# Patient Record
Sex: MALE | Race: WHITE | NOT HISPANIC OR LATINO | Employment: UNEMPLOYED | URBAN - METROPOLITAN AREA
[De-identification: names, ages, dates, MRNs, and addresses within clinical notes are randomized per-mention and may not be internally consistent; named-entity substitution may affect disease eponyms.]

---

## 2017-12-12 ENCOUNTER — NON-PROVIDER VISIT (OUTPATIENT)
Dept: OCCUPATIONAL MEDICINE | Facility: CLINIC | Age: 27
End: 2017-12-12

## 2017-12-12 DIAGNOSIS — Z11.1 ENCOUNTER FOR PPD TEST: ICD-10-CM

## 2017-12-12 PROCEDURE — 86580 TB INTRADERMAL TEST: CPT | Performed by: PREVENTIVE MEDICINE

## 2017-12-14 ENCOUNTER — NON-PROVIDER VISIT (OUTPATIENT)
Dept: OCCUPATIONAL MEDICINE | Facility: CLINIC | Age: 27
End: 2017-12-14

## 2017-12-14 LAB — TB WHEAL 3D P 5 TU DIAM: NORMAL MM

## 2018-03-13 ENCOUNTER — OFFICE VISIT (OUTPATIENT)
Dept: URGENT CARE | Facility: PHYSICIAN GROUP | Age: 28
End: 2018-03-13
Payer: COMMERCIAL

## 2018-03-13 VITALS
SYSTOLIC BLOOD PRESSURE: 124 MMHG | OXYGEN SATURATION: 98 % | TEMPERATURE: 98.6 F | BODY MASS INDEX: 24.1 KG/M2 | DIASTOLIC BLOOD PRESSURE: 76 MMHG | RESPIRATION RATE: 16 BRPM | HEIGHT: 68 IN | HEART RATE: 86 BPM | WEIGHT: 159 LBS

## 2018-03-13 DIAGNOSIS — J02.9 SORE THROAT: ICD-10-CM

## 2018-03-13 LAB
INT CON NEG: NEGATIVE
INT CON POS: POSITIVE
S PYO AG THROAT QL: NEGATIVE

## 2018-03-13 PROCEDURE — 87880 STREP A ASSAY W/OPTIC: CPT | Performed by: EMERGENCY MEDICINE

## 2018-03-13 PROCEDURE — 99203 OFFICE O/P NEW LOW 30 MIN: CPT | Performed by: EMERGENCY MEDICINE

## 2018-03-13 RX ORDER — ACETAMINOPHEN 500 MG
500-1000 TABLET ORAL EVERY 6 HOURS PRN
COMMUNITY
End: 2018-03-20

## 2018-03-13 RX ORDER — IBUPROFEN 600 MG/1
600 TABLET ORAL EVERY 6 HOURS PRN
COMMUNITY
End: 2018-03-20

## 2018-03-15 ASSESSMENT — ENCOUNTER SYMPTOMS
SHORTNESS OF BREATH: 0
SPUTUM PRODUCTION: 0
CHILLS: 0
NECK PAIN: 0
COUGH: 1
NAUSEA: 0
SENSORY CHANGE: 0
ABDOMINAL PAIN: 0
SORE THROAT: 1
PALPITATIONS: 0
EYE DISCHARGE: 0
FEVER: 0
SPEECH CHANGE: 0
WHEEZING: 0
EYE REDNESS: 0
DIARRHEA: 0
NERVOUS/ANXIOUS: 0
VOMITING: 0
STRIDOR: 0

## 2018-03-16 ENCOUNTER — TELEPHONE (OUTPATIENT)
Dept: URGENT CARE | Facility: PHYSICIAN GROUP | Age: 28
End: 2018-03-16

## 2018-03-16 NOTE — PROGRESS NOTES
"Subjective:      Cesar Guzman is a 27 y.o. male who presents with Pharyngitis (with a cough and congestion x 4 days )            HPI  P t is a 27 yr old male worried that he may have strep with a cough for the past 4 days , pt is a non smoker. Denies fever, chills, NVD. Using OTC meds,  Allergies   Allergen Reactions   • Augmentin    • Ceclor [Cefaclor]      Social History     Social History   • Marital status: Single     Spouse name: N/A   • Number of children: N/A   • Years of education: N/A     Occupational History   • Not on file.     Social History Main Topics   • Smoking status: Not on file   • Smokeless tobacco: Not on file   • Alcohol use Not on file   • Drug use: Unknown   • Sexual activity: Not on file     Other Topics Concern   • Not on file     Social History Narrative   • No narrative on file   No past medical history on file.  Review of Systems   Constitutional: Negative for chills and fever.   HENT: Positive for sore throat.    Eyes: Negative for discharge and redness.   Respiratory: Positive for cough. Negative for sputum production, shortness of breath, wheezing and stridor.    Cardiovascular: Negative for chest pain and palpitations.   Gastrointestinal: Negative for abdominal pain, diarrhea, nausea and vomiting.   Genitourinary: Negative.    Musculoskeletal: Negative for neck pain.   Skin: Negative for rash.   Neurological: Negative for sensory change and speech change.   Psychiatric/Behavioral: The patient is not nervous/anxious.            Objective:     /76   Pulse 86   Temp 37 °C (98.6 °F)   Resp 16   Ht 1.727 m (5' 8\")   Wt 72.1 kg (159 lb)   SpO2 98%   BMI 24.18 kg/m²      Physical Exam   Constitutional: He appears well-developed and well-nourished. No distress.   HENT:   Head: Normocephalic and atraumatic.   Right Ear: External ear normal.   Left Ear: External ear normal.   Eyes: Conjunctivae are normal. Right eye exhibits no discharge. Left eye exhibits no discharge.   Neck: " Normal range of motion.   Cardiovascular: Normal rate and regular rhythm.    Pulmonary/Chest: Effort normal and breath sounds normal. No stridor. No respiratory distress. He has no wheezes. He has no rales.   Abdominal: He exhibits no distension. There is no tenderness.   Musculoskeletal: Normal range of motion.   Lymphadenopathy:     He has no cervical adenopathy.   Neurological: He is alert. Coordination normal.   Skin: Skin is warm. No rash noted. He is not diaphoretic.   Psychiatric: He has a normal mood and affect. His behavior is normal.   Vitals reviewed.         Strep negative     Assessment/Plan:     1. Sore throat      I am recommending the patient initiate/ continue hydration efforts including the use of a vaporizer/humidifier/ netti pot. I also recommend the pt, initiate Mucinex and Sudafed or Dayquil if not hypertensive. In addition the patient will initiate the prescribed prescription medication/s: Xylocaine for prn dysphagia. If the patient's condition exacerbates with worsening dysphagia, shortness of breath, uncontrolled fever, headache or chest pressure he/she will return immediately to the urgent care or go to  the emergency department for further evaluation.    Lux Hernandez    - POCT Rapid Strep A  - lidocaine viscous 2% (XYLOCAINE) 2 % Solution; Take 5 mL by mouth 6 Times a Day.  Dispense: 200 mL; Refill: 1

## 2018-03-17 NOTE — TELEPHONE ENCOUNTER
Contacted pt and left message that he may need to be rechecked if unab[e to swallow and that 95% of sore throats are viral and do not warrant antibiotics.

## 2018-03-20 ENCOUNTER — OFFICE VISIT (OUTPATIENT)
Dept: MEDICAL GROUP | Facility: MEDICAL CENTER | Age: 28
End: 2018-03-20
Payer: COMMERCIAL

## 2018-03-20 VITALS
DIASTOLIC BLOOD PRESSURE: 70 MMHG | BODY MASS INDEX: 28.49 KG/M2 | OXYGEN SATURATION: 95 % | RESPIRATION RATE: 16 BRPM | SYSTOLIC BLOOD PRESSURE: 124 MMHG | HEART RATE: 82 BPM | WEIGHT: 188 LBS | HEIGHT: 68 IN | TEMPERATURE: 97.2 F

## 2018-03-20 DIAGNOSIS — F33.1 MODERATE EPISODE OF RECURRENT MAJOR DEPRESSIVE DISORDER (HCC): ICD-10-CM

## 2018-03-20 DIAGNOSIS — J02.9 PHARYNGITIS, UNSPECIFIED ETIOLOGY: ICD-10-CM

## 2018-03-20 DIAGNOSIS — F10.11 H/O ALCOHOL ABUSE: ICD-10-CM

## 2018-03-20 DIAGNOSIS — Z00.00 ROUTINE GENERAL MEDICAL EXAMINATION AT A HEALTH CARE FACILITY: ICD-10-CM

## 2018-03-20 PROCEDURE — 99214 OFFICE O/P EST MOD 30 MIN: CPT | Performed by: NURSE PRACTITIONER

## 2018-03-20 ASSESSMENT — ENCOUNTER SYMPTOMS: DEPRESSION: 1

## 2018-03-20 ASSESSMENT — PATIENT HEALTH QUESTIONNAIRE - PHQ9: CLINICAL INTERPRETATION OF PHQ2 SCORE: 0

## 2018-03-20 NOTE — PROGRESS NOTES
Subjective:      Cesar Guzman is a 27 y.o. male who presents with Establish Care        CC: Patient here today to establish care and for follow-up on pharyngitis and issues with depression.    HPI Cesar Guzman      1. Moderate episode of recurrent major depressive disorder (CMS-[facility])  Patient believes he has had problems with depression for a few years. He recently was discharged from the [facility] and states he was self-medicating his depression with alcohol and subsequently became an alcoholic. He was in rehabilitation for a while and states he has been sober now for one year. He believes that he still is having some issues with depression and would like to try counseling. He does not feel he is suicidal. He is currently a student and trying to decide which career he wishes to follow.    2. Pharyngitis, unspecified etiology  Patient recently seen at urgent care for sore throat and his rapid strep was negative and he was given advice on symptomatic treatment but states he was not improving so took some penicillin antibiotic he had leftover at home and after 4 doses felt better. He is no longer having sore throat.    3. H/O alcohol abuse  Patient states he has been sober one year and has gone through alcohol rehabilitation. He denies tobacco or drug usage.    4. Routine general medical examination at a health care facility  Patient would like to get blood work done especially with his history of alcohol abuse.  No current outpatient prescriptions on file.     No current facility-administered medications for this visit.      Social History   Substance Use Topics   • Smoking status: Never Smoker   • Smokeless tobacco: Never Used   • Alcohol use No     Family History   Problem Relation Age of Onset   • No Known Problems Mother    • No Known Problems Father    History reviewed. No pertinent past medical history.    Review of Systems   Psychiatric/Behavioral: Positive for depression.   All other systems reviewed and are  "negative.         Objective:     /70   Pulse 82   Temp 36.2 °C (97.2 °F)   Resp 16   Ht 1.727 m (5' 8\")   Wt 85.3 kg (188 lb)   SpO2 95%   BMI 28.59 kg/m²      Physical Exam   Constitutional: He is oriented to person, place, and time. He appears well-developed and well-nourished. No distress.   HENT:   Head: Normocephalic and atraumatic.   Right Ear: External ear normal.   Left Ear: External ear normal.   Nose: Nose normal.   Mouth/Throat: Oropharynx is clear and moist.   Eyes: Conjunctivae are normal. Right eye exhibits no discharge. Left eye exhibits no discharge.   Neck: Normal range of motion. Neck supple. No tracheal deviation present. No thyromegaly present.   Cardiovascular: Normal rate, regular rhythm and normal heart sounds.    No murmur heard.  Pulmonary/Chest: Effort normal and breath sounds normal. No respiratory distress. He has no wheezes. He has no rales.   Abdominal: Soft. Bowel sounds are normal. He exhibits no distension and no mass. There is no tenderness. There is no rebound and no guarding. No hernia.   Lymphadenopathy:     He has no cervical adenopathy.   Neurological: He is alert and oriented to person, place, and time. Coordination normal.   Skin: Skin is warm and dry. No rash noted. He is not diaphoretic. No erythema.   Psychiatric: He has a normal mood and affect. His behavior is normal. Judgment and thought content normal.   Nursing note and vitals reviewed.              Assessment/Plan:     1. Moderate episode of recurrent major depressive disorder (CMS-HCC)  I discussed options with patient including medication but we agree that first he will try counseling to find out if he has depression with anxiety or other mental health issues and if they are unable to prescribe the medicine and would like him on an SSRI, I will prescribe this for him at a later date. He was reminded to go to the emergency room if he becomes suicidal or highly depressed.  - REFERRAL TO PSYCHOLOGY    2. " Pharyngitis, unspecified etiology  I advised patient he should not use leftover antibiotics but he states he did feel better after 4 doses of penicillin and has no further sore throat issues.    3. H/O alcohol abuse  Patient reports he has been sober one year after alcohol rehabilitation in the [employer].    4. Routine general medical examination at a health care facility  I will do lab work especially to check liver enzymes because of his history of alcohol abuse.  - COMP METABOLIC PANEL; Future  - LIPID PROFILE; Future  - TSH; Future

## 2019-12-08 ENCOUNTER — HOSPITAL ENCOUNTER (EMERGENCY)
Facility: MEDICAL CENTER | Age: 29
End: 2019-12-08
Attending: EMERGENCY MEDICINE
Payer: COMMERCIAL

## 2019-12-08 ENCOUNTER — APPOINTMENT (OUTPATIENT)
Dept: RADIOLOGY | Facility: MEDICAL CENTER | Age: 29
End: 2019-12-08
Attending: EMERGENCY MEDICINE
Payer: COMMERCIAL

## 2019-12-08 ENCOUNTER — HOSPITAL ENCOUNTER (OUTPATIENT)
Dept: RADIOLOGY | Facility: MEDICAL CENTER | Age: 29
End: 2019-12-08

## 2019-12-08 VITALS
HEIGHT: 68 IN | DIASTOLIC BLOOD PRESSURE: 88 MMHG | HEART RATE: 61 BPM | WEIGHT: 197.31 LBS | TEMPERATURE: 98.4 F | RESPIRATION RATE: 20 BRPM | OXYGEN SATURATION: 100 % | SYSTOLIC BLOOD PRESSURE: 114 MMHG | BODY MASS INDEX: 29.9 KG/M2

## 2019-12-08 DIAGNOSIS — M51.36 BULGING OF LUMBAR INTERVERTEBRAL DISC: ICD-10-CM

## 2019-12-08 DIAGNOSIS — M54.41 ACUTE RIGHT-SIDED LOW BACK PAIN WITH RIGHT-SIDED SCIATICA: ICD-10-CM

## 2019-12-08 PROCEDURE — 99284 EMERGENCY DEPT VISIT MOD MDM: CPT

## 2019-12-08 PROCEDURE — 72148 MRI LUMBAR SPINE W/O DYE: CPT

## 2019-12-08 PROCEDURE — 51798 US URINE CAPACITY MEASURE: CPT

## 2019-12-08 RX ORDER — IBUPROFEN 200 MG
200 TABLET ORAL EVERY 6 HOURS PRN
COMMUNITY

## 2019-12-08 RX ORDER — METHYLPREDNISOLONE 4 MG/1
TABLET ORAL
Qty: 1 PACKAGE | Refills: 0 | Status: SHIPPED | OUTPATIENT
Start: 2019-12-08

## 2019-12-08 NOTE — LETTER
[facility], EMERGENCY DEPT   [address], [place] [address]  Phone: Dept: [phone] - Fax:        [facility] Progress Report and Disability Certification  Date of Service: 12/8/2019   No Show:  No  Date / Time of Next Visit:     Claim Information   Patient Name: Cesar Guzman  Claim Number:     Employer: [employer]  Date of Injury: 10/31/2019     Insurer / TPA: [employer] ID / SSN:    Occupation: ER Tech Diagnosis: Diagnoses of Acute right-sided low back pain with right-sided sciatica and Bulging of lumbar intervertebral disc were pertinent to this visit.    Medical Information   Related to Industrial Injury? Yes ***   Subjective Complaints:      Objective Findings: Back pain and bowel incontience   Pre-Existing Condition(s):     Assessment:   Condition Worsened    Status: Additional Care Required  Permanent Disability:No    Plan: MedicationConsultation    Diagnostics: MRI    Comments:  Due to the patient's urinary incontinence neurosurgery requested a repeat MRI to rule out cord compression.  The repeat MRI showed disc bulging but no evidence of cord compression.  Therefore patient will follow up with his outpatient neurosurgeon.    Disability Information   Status: Temporarily Totally Disabled    From:     Through:   Restrictions are: Temporary   Physical Restrictions   Sitting:    Standing:    Stooping:    Bending:      Squatting:    Walking:    Climbing:    Pushing:      Pulling:    Other:    Reaching Above Shoulder (L):   Reaching Above Shoulder (R):       Reaching Below Shoulder (L):    Reaching Below Shoulder (R):      Not to exceed Weight Limits   Carrying(hrs):   Weight Limit(lb):   Lifting(hrs):   Weight  Limit(lb):     Comments:      Repetitive Actions   Hands: i.e. Fine Manipulations from Grasping:     Feet: i.e. Operating Foot Controls:     Driving / Operate Machinery:     Physician Name: Shanti Melendez  Physician Signature: DHEERAJ Leon M.D. e-Signature:  , Medical Director   Clinic Name / Location: [facility], EMERGENCY DEPT  [address] [place] [id number]  [phone]     Clinic Phone Number: Dept: [phone]   Appointment Time:  Visit Start Time:    Check-In Time:  7:58 AM Visit Discharge Time:    Original-Treating Physician or Chiropractor    Page 2-Insurer/TPA    Page 3-Employer    Page 4-Employee

## 2019-12-08 NOTE — DISCHARGE INSTRUCTIONS
Call [facility] [place] tomorrow to update them on your visit to the ER.  Take the medication as directed.  Any worsening pain, weakness in your legs, bowel or bladder problems fevers or concerns return.

## 2019-12-08 NOTE — LETTER
[facility], EMERGENCY DEPT   [address], [place] [address]  Phone: Dept: [phone] - Fax:        [facility] Progress Report and Disability Certification  Date of Service: 12/8/2019   No Show:  No  Date / Time of Next Visit:     Claim Information   Patient Name: Cesar Guzman  Claim Number:     Employer: [employer]  Date of Injury: 10/31/2019     Insurer / TPA: [employer] ID / SSN: [id number]    Occupation: ER Tech Diagnosis: Diagnoses of Acute right-sided low back pain with right-sided sciatica and Bulging of lumbar intervertebral disc were pertinent to this visit.    Medical Information   Related to Industrial Injury?   ***   Subjective Complaints:      Objective Findings:     Pre-Existing Condition(s):     Assessment:        Status:    Permanent Disability:     Plan:      Diagnostics:      Comments:       Disability Information   Status:      From:     Through:   Restrictions are:     Physical Restrictions   Sitting:    Standing:    Stooping:    Bending:      Squatting:    Walking:    Climbing:    Pushing:      Pulling:    Other:    Reaching Above Shoulder (L):   Reaching Above Shoulder (R):       Reaching Below Shoulder (L):    Reaching Below Shoulder (R):      Not to exceed Weight Limits   Carrying(hrs):   Weight Limit(lb):   Lifting(hrs):   Weight  Limit(lb):     Comments:      Repetitive Actions   Hands: i.e. Fine Manipulations from Grasping:     Feet: i.e. Operating Foot Controls:     Driving / Operate Machinery:     Physician Name: Shanti Melendez Physician Signature:   e-Signature:  , Medical Director   Clinic Name / Location: [facility], EMERGENCY DEPT  [address] [place] [id number] [phone]     Clinic Phone Number: Dept: [phone]   Appointment Time:  Visit Start Time:    Check-In Time:  7:58 AM Visit Discharge Time:    Original-Treating Physician  or Chiropractor    Page 2-Insurer/TPA    Page 3-Employer    Page 4-Employee

## 2019-12-08 NOTE — ED PROVIDER NOTES
ED Provider Note    Scribed for Shanti Melendez M.D. by Ami Booth. 12/8/2019, 9:00 AM.    Primary care provider: DANICA Toscano  Means of arrival: Walk-in  History obtained from: Patient  History limited by: None    CHIEF COMPLAINT  Chief Complaint   Patient presents with   • Back Pain   • Incontinence       HPI  Cesar Guzman is a 29 y.o. male who presents to the Emergency Department complaining of constant back pain, urinary incontinence, and numbness/tingling onset a couple weeks ago from a work-related injury. Patient states he sustained a back injury 1.5 months ago and follows up with Dr. Nicole from [facility] with a scheduled microdiscectomy next week for a herniated disc. For the past few week, patient has been having numbness and tingling in both his legs. His symptoms in his left leg has resolved but he still has right leg numbness/tingling. The patient states he was told by Dr. Nicole to present to the ED for any urinary or bowel incontinence. This morning, patient reports his back was hurting and he experience an episode of urinary incontinence, which prompted him to present to the ED. He endorses associated right leg weakness and scrotum numbness but denies any new falls or fevers. The patient denies any modifying factors.     REVIEW OF SYSTEMS  Pertinent positives include back pain, urinary incontinence, right leg numbness/tingling, right leg weakness, and scrotum numbness. Pertinent negatives include no new falls or fevers, bowel incontinence, abdominal pain, IV drug use, cancer, HIV or diabetes. See HPI for further details. All other systems are negative.     PAST MEDICAL HISTORY   No pertinent past medical history noted.     FAMILY HISTORY  Family History   Problem Relation Age of Onset   • No Known Problems Mother    • No Known Problems Father        SOCIAL HISTORY  Social History     Tobacco Use   • Smoking status: Never Smoker   • Smokeless tobacco:  "Never Used   Substance and Sexual Activity   • Alcohol use: No   • Drug use: No   • Sexual activity: Not Currently       SURGICAL HISTORY  patient denies any surgical history    CURRENT MEDICATIONS  Home Medications     Reviewed by Dejah Almaguer R.N. (Registered Nurse) on 12/08/19 at 0804  Med List Status: Partial   Medication Last Dose Status   ibuprofen (MOTRIN) 200 MG Tab  Active                ALLERGIES  Allergies   Allergen Reactions   • Augmentin    • Ceclor [Cefaclor]        PHYSICAL EXAM  VITAL SIGNS: /88   Pulse 68   Temp 36.9 °C (98.4 °F) (Temporal)   Resp (!) 71   Ht 1.727 m (5' 8\")   Wt 89.5 kg (197 lb 5 oz)   SpO2 99%   BMI 30.00 kg/m²   Constitutional: Well developed, Well nourished, No acute distress, Non-toxic appearance.   HENT: No facial asymmetry, no midline cervical tenderness.  Cardiovascular: Normal heart rate, Normal rhythm  Thorax & Lungs: Normal breath sounds, No respiratory distress  Abdomen: Bowel sounds normal, Soft, No tenderness  Skin: Warm, Dry, No erythema, No rash.   Back: Diffuse lower lumbar tenderness, no step-offs, no gross deformity,  no patellar reflex asymmetry.  Extremities: Intact distal pulses, No tenderness, normal dorsiflexion of great toe bilaterally,   Neurologic: Alert & oriented x 3, Decreased sensation to top of right foot, Intact dorsal flexion of foot bilaterally, Symmetrical patellar reflexes, Subjective tingling to scrotum. Normal gait.  Psych: alert, appropriate    RADIOLOGY/PROCEDURES  MR-LUMBAR SPINE-W/O   Final Result      1.  Moderate disc bulge at L5-S1 combined with facet arthropathy and spondylolisthesis results in mild central canal and bilateral neural foraminal stenosis.   2.  No evidence of cord compression.   3.  Additional multilevel degenerative changes of the lumbar spine as described above.      OUTSIDE IMAGES-MR LUMBAR SPINE   Final Result        The radiologist's interpretations of all radiological studies have been reviewed " by me.        COURSE & MEDICAL DECISION MAKING  Pertinent Labs & Imaging studies reviewed. (See chart for details)    9:00 AM - Patient was examined at bedside. Discussed with patient I will consult with Dr. Nicole to determine plan of care. Patient understands.     Patient presents with complaints of back pain and urinary incontinence.  Sounds like he had an episode of this at the beginning of his episode of back pain that had eventually resolved.  Few weeks ago he began to have tingling again in his scrotum and has been having tingling down his right leg and some mild weakness in his leg.  No evidence on exam of a foot drop.  I do not appreciate any dramatic weakness to the right leg.  He does complain of some tingling with palpation of the scrotum.  He has not had any bowel incontinence with this episode.  He does not have any history of fevers.  He does have a history of a bulging disc.  I think with the development of the scrotal tingling as well as an episode of urinary incontinence this could indicate impingement of the cord.    9:14 AM - Paged Dr. Nicole.     9:43 AM - I discussed the patient's case and the above findings with Dr. Nicole (Ortho) who is out of town and would like me to consult with on call neurosurgeon due to concern for symptoms which may require emergent surgery.     9:47 AM - Updated patient on plan of care and consult with Dr. Nicole. Patient consents to plan of care. Paged Neuro surgery.     9:55 AM - I discussed the patient's case and the above findings with Dr. Farley (Neuro Surgery) who recommends MRI. Updated patient on plan of care. Ordered MR-Lumbar spine.     11:50 AM - I discussed the patient's case and the above findings with Dr. Farley (Neuro surgery) who states mri shows no indication for emergent surgery, wants patient to follow up in clinic.     12:30 PM - Updated patient on MRI results and discussed follow up with Dr. Farley. Patient was given return  "precautions. Discussed plan of discharge as outlined below and patient was given the opportunity to ask questions. Patient understands and is comfortable with plan.  I will try a Medrol Dosepak that may help with the bulging disc.  Patient has not required any pain medication here in the ER.    Discharge vitals:  /88   Pulse 61   Temp 36.9 °C (98.4 °F) (Temporal)   Resp 20   Ht 1.727 m (5' 8\")   Wt 89.5 kg (197 lb 5 oz)   SpO2 100%   BMI 30.00 kg/m²     The patient will return for new or worsening symptoms and is stable at the time of discharge.    DISPOSITION:  Patient will be discharged home in stable condition.    FOLLOW UP:  Denilson Nicole M.D.  [address] [facility] [address]  [facility] [id number] [phone]      If symptoms worsen, return to the er.      OUTPATIENT MEDICATIONS:  New Prescriptions    METHYLPREDNISOLONE (MEDROL DOSEPAK) 4 MG TABLET THERAPY PACK    Use as directed       FINAL IMPRESSION  Visit Diagnoses     ICD-10-CM   1. Acute right-sided low back pain with right-sided sciatica M54.41   2. Bulging of lumbar intervertebral disc M51.26        Ami OLGUIN (Scribe), am scribing for, and in the presence of, Shanti Melendez M.D..    Electronically signed by: Ami Booth (Scribe), 12/8/2019    Shanti OLGUIN M.D. personally performed the services described in this documentation, as scribed by Ami Booth in my presence, and it is both accurate and complete.    C    The note accurately reflects work and decisions made by me.  Shanti Melendez  12/8/2019  12:48 PM      "

## 2019-12-08 NOTE — ED NOTES
Pt had an episode of lose of bladder this morning while lying in bed.  Pt called spine center and was instructed to come to ER .

## 2019-12-08 NOTE — ED TRIAGE NOTES
Chief Complaint   Patient presents with   • Back Pain   • Incontinence   Pt ambulates to triage in [facility].  Pt reports he has herniated disk and is supposed to have back surgery soon with [facility].  Pt states he was just waking up when he had an episode of bladder incontinence this morning.  Pt has numbness/tingling in RLE since back injury on October 31, 2019.

## 2022-07-27 ENCOUNTER — HOSPITAL ENCOUNTER (OUTPATIENT)
Dept: LAB | Facility: MEDICAL CENTER | Age: 32
End: 2022-07-27
Attending: PHYSICIAN ASSISTANT
Payer: COMMERCIAL

## 2022-07-27 LAB
ALBUMIN SERPL BCP-MCNC: 4.8 G/DL (ref 3.2–4.9)
ALBUMIN/GLOB SERPL: 1.9 G/DL
ALP SERPL-CCNC: 70 U/L (ref 30–99)
ALT SERPL-CCNC: 33 U/L (ref 2–50)
ANION GAP SERPL CALC-SCNC: 11 MMOL/L (ref 7–16)
AST SERPL-CCNC: 23 U/L (ref 12–45)
BASOPHILS # BLD AUTO: 0.5 % (ref 0–1.8)
BASOPHILS # BLD: 0.03 K/UL (ref 0–0.12)
BILIRUB SERPL-MCNC: 0.9 MG/DL (ref 0.1–1.5)
BUN SERPL-MCNC: 16 MG/DL (ref 8–22)
CALCIUM SERPL-MCNC: 9.3 MG/DL (ref 8.5–10.5)
CHLORIDE SERPL-SCNC: 106 MMOL/L (ref 96–112)
CHOLEST SERPL-MCNC: 243 MG/DL (ref 100–199)
CO2 SERPL-SCNC: 24 MMOL/L (ref 20–33)
CREAT SERPL-MCNC: 0.97 MG/DL (ref 0.5–1.4)
EOSINOPHIL # BLD AUTO: 0.05 K/UL (ref 0–0.51)
EOSINOPHIL NFR BLD: 0.9 % (ref 0–6.9)
ERYTHROCYTE [DISTWIDTH] IN BLOOD BY AUTOMATED COUNT: 43.2 FL (ref 35.9–50)
EST. AVERAGE GLUCOSE BLD GHB EST-MCNC: 97 MG/DL
FASTING STATUS PATIENT QL REPORTED: NORMAL
GFR SERPLBLD CREATININE-BSD FMLA CKD-EPI: 106 ML/MIN/1.73 M 2
GLOBULIN SER CALC-MCNC: 2.5 G/DL (ref 1.9–3.5)
GLUCOSE SERPL-MCNC: 85 MG/DL (ref 65–99)
HBA1C MFR BLD: 5 % (ref 4–5.6)
HCT VFR BLD AUTO: 50.3 % (ref 42–52)
HDLC SERPL-MCNC: 38 MG/DL
HGB BLD-MCNC: 17.4 G/DL (ref 14–18)
IMM GRANULOCYTES # BLD AUTO: 0.01 K/UL (ref 0–0.11)
IMM GRANULOCYTES NFR BLD AUTO: 0.2 % (ref 0–0.9)
LDLC SERPL CALC-MCNC: 164 MG/DL
LIPASE SERPL-CCNC: 25 U/L (ref 11–82)
LYMPHOCYTES # BLD AUTO: 1.91 K/UL (ref 1–4.8)
LYMPHOCYTES NFR BLD: 34.4 % (ref 22–41)
MCH RBC QN AUTO: 32.6 PG (ref 27–33)
MCHC RBC AUTO-ENTMCNC: 34.6 G/DL (ref 33.7–35.3)
MCV RBC AUTO: 94.4 FL (ref 81.4–97.8)
MONOCYTES # BLD AUTO: 0.32 K/UL (ref 0–0.85)
MONOCYTES NFR BLD AUTO: 5.8 % (ref 0–13.4)
NEUTROPHILS # BLD AUTO: 3.23 K/UL (ref 1.82–7.42)
NEUTROPHILS NFR BLD: 58.2 % (ref 44–72)
NRBC # BLD AUTO: 0 K/UL
NRBC BLD-RTO: 0 /100 WBC
PLATELET # BLD AUTO: 271 K/UL (ref 164–446)
PMV BLD AUTO: 10.5 FL (ref 9–12.9)
POTASSIUM SERPL-SCNC: 4.5 MMOL/L (ref 3.6–5.5)
PROT SERPL-MCNC: 7.3 G/DL (ref 6–8.2)
RBC # BLD AUTO: 5.33 M/UL (ref 4.7–6.1)
SODIUM SERPL-SCNC: 141 MMOL/L (ref 135–145)
TRIGL SERPL-MCNC: 207 MG/DL (ref 0–149)
WBC # BLD AUTO: 5.6 K/UL (ref 4.8–10.8)

## 2022-07-27 PROCEDURE — 36415 COLL VENOUS BLD VENIPUNCTURE: CPT

## 2022-07-27 PROCEDURE — 80053 COMPREHEN METABOLIC PANEL: CPT

## 2022-07-27 PROCEDURE — 85025 COMPLETE CBC W/AUTO DIFF WBC: CPT

## 2022-07-27 PROCEDURE — 80061 LIPID PANEL: CPT

## 2022-07-27 PROCEDURE — 83036 HEMOGLOBIN GLYCOSYLATED A1C: CPT

## 2022-07-27 PROCEDURE — 84443 ASSAY THYROID STIM HORMONE: CPT

## 2022-07-27 PROCEDURE — 83690 ASSAY OF LIPASE: CPT

## 2022-07-28 LAB — TSH SERPL DL<=0.005 MIU/L-ACNC: 1.36 UIU/ML (ref 0.38–5.33)

## 2023-09-28 NOTE — PROGRESS NOTES
Bladder scan = 220 ML. RN was notified of the results.   Detail Level: Zone Render In Strict Bullet Format?: No Modify Regimen: Dapsone gel BID PRN (inadequate response) -> Clindamycin BID PRN as spot treatment Continue Regimen: - BPO wash QD\\n- Tretinoin 0.025% qHS (encouraged more consistent use with mositurizer)